# Patient Record
Sex: MALE | Race: WHITE
[De-identification: names, ages, dates, MRNs, and addresses within clinical notes are randomized per-mention and may not be internally consistent; named-entity substitution may affect disease eponyms.]

---

## 2019-10-17 PROBLEM — Z00.00 ENCOUNTER FOR PREVENTIVE HEALTH EXAMINATION: Status: ACTIVE | Noted: 2019-10-17

## 2019-11-12 ENCOUNTER — APPOINTMENT (OUTPATIENT)
Dept: PLASTIC SURGERY | Facility: CLINIC | Age: 34
End: 2019-11-12
Payer: COMMERCIAL

## 2019-11-12 DIAGNOSIS — L72.9 FOLLICULAR CYST OF THE SKIN AND SUBCUTANEOUS TISSUE, UNSPECIFIED: ICD-10-CM

## 2019-11-12 DIAGNOSIS — Z72.0 TOBACCO USE: ICD-10-CM

## 2019-11-12 PROCEDURE — 99203 OFFICE O/P NEW LOW 30 MIN: CPT

## 2019-11-12 NOTE — ASSESSMENT
[FreeTextEntry1] : 34 y/o M with multiple scalp cysts and right chest lipoma\par \par as above\par right ant chest lipoma approx 5cm x 4cm\par also multiple scalp cysts (5--all <2cm\par \par daily marijuana\par no cigarettes\par nondiabetic\par work in construction\par , one 5 year old boy\par \par excision in [place] of scalp cysts and chest lipoma\par \par Regarding the procedure, we discussed scarring, poor wound healing, bleeding, infection, need for additional surgery, and dissatisfaction with the outcome.  Also discussed possibility of keloid and/or hypertrophic scar formation as well as recurrence.  All questions were answered and risks understood.\par \par We discussed the risk of smoking on wound healing and the increased complications that are associated with smoking.\par

## 2019-11-12 NOTE — HISTORY OF PRESENT ILLNESS
[FreeTextEntry1] : Pt is a 32 y/o M with no significant PMH who presents for evaluation of multiple scalp cysts x5 years as well as right chest cyst, getting progressively larger. C/o pain to two areas. No treatment thus far. Denies drainage.\par \par Occasional cigarette smoker\par Daily marijuana\par Occ: \par Lives with wife and children

## 2019-11-12 NOTE — PHYSICAL EXAM
[de-identified] : well-appearing, NAD [de-identified] : 5 subcutaneous cystic lesions, largest to central scalp, approx 2cm, all mobile and nontender [de-identified] : right breast with 5cm subcutaneous soft tissue mass, mobile and nontender

## 2019-11-13 ENCOUNTER — LABORATORY RESULT (OUTPATIENT)
Age: 34
End: 2019-11-13

## 2019-11-13 ENCOUNTER — OUTPATIENT (OUTPATIENT)
Dept: OUTPATIENT SERVICES | Facility: HOSPITAL | Age: 34
LOS: 1 days | Discharge: HOME | End: 2019-11-13

## 2019-11-13 DIAGNOSIS — I10 ESSENTIAL (PRIMARY) HYPERTENSION: ICD-10-CM

## 2019-11-13 DIAGNOSIS — E78.5 HYPERLIPIDEMIA, UNSPECIFIED: ICD-10-CM

## 2019-11-13 DIAGNOSIS — Z00.00 ENCOUNTER FOR GENERAL ADULT MEDICAL EXAMINATION WITHOUT ABNORMAL FINDINGS: ICD-10-CM

## 2019-11-25 ENCOUNTER — OUTPATIENT (OUTPATIENT)
Dept: OUTPATIENT SERVICES | Facility: HOSPITAL | Age: 34
LOS: 1 days | Discharge: HOME | End: 2019-11-25
Payer: COMMERCIAL

## 2019-11-25 ENCOUNTER — APPOINTMENT (OUTPATIENT)
Dept: PLASTIC SURGERY | Facility: AMBULATORY SURGERY CENTER | Age: 34
End: 2019-11-25
Payer: COMMERCIAL

## 2019-11-25 ENCOUNTER — RESULT REVIEW (OUTPATIENT)
Age: 34
End: 2019-11-25

## 2019-11-25 VITALS
SYSTOLIC BLOOD PRESSURE: 143 MMHG | DIASTOLIC BLOOD PRESSURE: 88 MMHG | RESPIRATION RATE: 17 BRPM | HEART RATE: 68 BPM | OXYGEN SATURATION: 97 %

## 2019-11-25 VITALS
OXYGEN SATURATION: 98 % | HEART RATE: 62 BPM | DIASTOLIC BLOOD PRESSURE: 76 MMHG | SYSTOLIC BLOOD PRESSURE: 129 MMHG | HEIGHT: 69 IN | WEIGHT: 199.96 LBS | TEMPERATURE: 98 F | RESPIRATION RATE: 18 BRPM

## 2019-11-25 PROCEDURE — 88304 TISSUE EXAM BY PATHOLOGIST: CPT | Mod: 26

## 2019-11-25 PROCEDURE — 21552 EXC NECK LES SC 3 CM/>: CPT | Mod: 59

## 2019-11-25 PROCEDURE — 14021 TIS TRNFR S/A/L 10.1-30 SQCM: CPT

## 2019-11-25 RX ORDER — ONDANSETRON 8 MG/1
4 TABLET, FILM COATED ORAL ONCE
Refills: 0 | Status: DISCONTINUED | OUTPATIENT
Start: 2019-11-25 | End: 2019-12-17

## 2019-11-25 RX ORDER — CEPHALEXIN 500 MG
1 CAPSULE ORAL
Qty: 12 | Refills: 0
Start: 2019-11-25 | End: 2019-11-27

## 2019-11-25 RX ORDER — SODIUM CHLORIDE 9 MG/ML
1000 INJECTION, SOLUTION INTRAVENOUS
Refills: 0 | Status: DISCONTINUED | OUTPATIENT
Start: 2019-11-25 | End: 2019-12-17

## 2019-11-25 RX ORDER — HYDROMORPHONE HYDROCHLORIDE 2 MG/ML
0.5 INJECTION INTRAMUSCULAR; INTRAVENOUS; SUBCUTANEOUS
Refills: 0 | Status: DISCONTINUED | OUTPATIENT
Start: 2019-11-25 | End: 2019-11-25

## 2019-11-25 RX ORDER — OXYCODONE HYDROCHLORIDE 5 MG/1
5 TABLET ORAL ONCE
Refills: 0 | Status: DISCONTINUED | OUTPATIENT
Start: 2019-11-25 | End: 2019-11-25

## 2019-11-25 RX ORDER — TRAMADOL HYDROCHLORIDE 50 MG/1
1 TABLET ORAL
Qty: 10 | Refills: 0
Start: 2019-11-25 | End: 2019-11-27

## 2019-11-25 RX ADMIN — SODIUM CHLORIDE 100 MILLILITER(S): 9 INJECTION, SOLUTION INTRAVENOUS at 09:15

## 2019-11-25 NOTE — ASU DISCHARGE PLAN (ADULT/PEDIATRIC) - CARE PROVIDER_API CALL
Christiano Lucas)  Plastic Surgery; Surgery of the Hand  [address] [place] [address], [address]  [place] [address]  Phone: [phone]  Fax: [phone]  Follow Up Time:

## 2019-11-25 NOTE — ASU DISCHARGE PLAN (ADULT/PEDIATRIC) - PAIN MANAGEMENT
Keflex, Tramadol for pain as needed/Prescriptions electronically submitted to pharmacy from [facility]

## 2019-11-25 NOTE — BRIEF OPERATIVE NOTE - NSICDXBRIEFPOSTOP_GEN_ALL_CORE_FT
POST-OP DIAGNOSIS:  Lipoma of chest wall 25-Nov-2019 08:59:11  Deepali Blackwood  Scalp cyst 25-Nov-2019 08:59:03  Deepali Blackwood

## 2019-11-25 NOTE — ASU DISCHARGE PLAN (ADULT/PEDIATRIC) - ASU DC SPECIAL INSTRUCTIONSFT
Keep site clean and dry for at least 3 days.   Do not remove any dressings or get them wet.   Sleep with head elevated!  Place old towel over pillow as the incisions may drain slightly. Do not be alarmed.  Rest!!!

## 2019-11-25 NOTE — BRIEF OPERATIVE NOTE - NSICDXBRIEFPREOP_GEN_ALL_CORE_FT
PRE-OP DIAGNOSIS:  Scalp cyst 25-Nov-2019 08:58:42 multiple scalp cysts Deepali Blackwood  Lipoma of chest wall 25-Nov-2019 08:57:35 right chest Deepali Blackwood

## 2019-11-25 NOTE — BRIEF OPERATIVE NOTE - NSICDXBRIEFPROCEDURE_GEN_ALL_CORE_FT
PROCEDURES:  Excision, lipoma, breast 25-Nov-2019 09:01:24 right chest, excision of multiple scalp cysts Deepali Blackwood

## 2019-11-27 LAB — SURGICAL PATHOLOGY STUDY: SIGNIFICANT CHANGE UP

## 2019-11-29 DIAGNOSIS — D17.1 BENIGN LIPOMATOUS NEOPLASM OF SKIN AND SUBCUTANEOUS TISSUE OF TRUNK: ICD-10-CM

## 2019-11-29 DIAGNOSIS — L72.11 PILAR CYST: ICD-10-CM

## 2019-12-03 ENCOUNTER — APPOINTMENT (OUTPATIENT)
Dept: PLASTIC SURGERY | Facility: CLINIC | Age: 34
End: 2019-12-03
Payer: COMMERCIAL

## 2019-12-03 PROCEDURE — 99024 POSTOP FOLLOW-UP VISIT: CPT

## 2019-12-03 NOTE — PHYSICAL EXAM
[de-identified] : well-appearing, NAD [de-identified] : scattered scalp incisions all healing appropriately- clean, dry and intact with minimal swelling, no evidence of cellulitis or fluid collection, chromic sutures in place  [de-identified] : right chest incision clean, dry and intact, no cellulitis or fluid collection

## 2019-12-03 NOTE — ASSESSMENT
[FreeTextEntry1] : 32 y/o M with multiple scalp cysts and right chest lipoma now POD#8 s/p excision. Doing well. \par \par - dressings changed\par - daily Aquaphor\par - may use Scarguard on chest incision in 1 week\par - activity restrictions reinforced \par - may shower \par - pathology discussed\par - f/u 2-3 weeks\par \par \par \par

## 2019-12-03 NOTE — DATA REVIEWED
[FreeTextEntry1] :  Pathology             Final\par \par No Documents Attached\par \par \par \par   [facility] Accession Number : [id number]\par \par XENIA NOONAN                        1\par \par \par \par Surgical Final Report\par \par \par \par \par Final Diagnosis\par 1. Right chest lipoma:\par - Lipoma.\par \par 2. Scalp cysts:\par - Pilar cysts.\par \par Verified by: Liz New M.D.\par (Electronic Signature)\par Reported on: 11/27/19 15:28 EST, [facility],\[place] [address]\par Phone: [phone]   Fax: [phone]\par _________________________________________________________________\par \par Clinical History\par Excision\par Several yr history of scalp cysts and right chest lipoma\par \par Specimen(s) Submitted\par 1     Right chest lipoma\par 2     Scalp cysts\par \par Gross Description\par 1. The specimen is received in formalin, labeled "right chest\par lipoma" and consists of multiple fragments of lobulated tan\par yellow fibroadipose tissue measuring 7 x 7 x 3 cm in aggregate.\par Serial sections reveal homogenous light yellow tissue \par by thin grey fibrous bands. Representative sections are\par submitted. (3 block)\par \par 2.The specimen is received in formalin, labeled " multiple scalp\par cysts" and consists of five tan white cystic structures,\par measuring 1.5 x 1 x 0.5 cm, 1 x 1 x 0.5 cm and 1.5 x 0.5 x 0.5 cm\par and two smaller cysts, measuring 0.5 cm in maximum dimension. The\par cysts have smooth cream white external appearance. The external\par surface is inked black. Cut surface through all cysts are similar\par and reveals unilocular cyst cavity filled with tan yellow\par homogenous material. Representative sections are submitted. (1\par block)\par \par Specimen was received and underwent gross examination at [facility], [address] [place] [address],\par [place] [address].\par \par 11/25/19 15:20 ia\par \par Postoperative Diagnosis\par Multiple scalp cysts/right chest lipoma\par \par  \par \par  Ordered by: JESSICA REICH IV       Collected/Examined: 25Nov2019 08:50AM       \par Verification Required       Stage: Final       \par  Performed at: [facility]       Resulted: 27Nov2019 03:28PM       Last Updated: 27Nov2019 03:28PM       Accession: [id number]

## 2019-12-03 NOTE — HISTORY OF PRESENT ILLNESS
[FreeTextEntry1] : Pt is a 32 y/o M with no significant PMH who presents for evaluation of multiple scalp cysts x5 years as well as right chest cyst, getting progressively larger. C/o pain to two areas. No treatment thus far. Denies drainage.\par \par Occasional cigarette smoker\par Daily marijuana\par Occ: \par Lives with wife and children\par \par Interval hx (12/3/19). Patient presents today POD#8 s/p excision of multiple scalp cysts and right chest lipoma. Doing well c/o improving incisional discomfort and swelling. Completed prescribed course of oral antibiotics. Denies any fever, chills or bleeding.

## 2019-12-19 ENCOUNTER — APPOINTMENT (OUTPATIENT)
Dept: PLASTIC SURGERY | Facility: CLINIC | Age: 34
End: 2019-12-19
Payer: COMMERCIAL

## 2019-12-19 DIAGNOSIS — L72.11 PILAR CYST: ICD-10-CM

## 2019-12-19 DIAGNOSIS — D17.1 BENIGN LIPOMATOUS NEOPLASM OF SKIN AND SUBCUTANEOUS TISSUE OF TRUNK: ICD-10-CM

## 2019-12-19 PROCEDURE — 99024 POSTOP FOLLOW-UP VISIT: CPT

## 2019-12-19 NOTE — ASSESSMENT
[FreeTextEntry1] : 32 y/o M with multiple scalp cysts and right chest lipoma now 3 weeks s/p excision. Doing well. \par \par - remaining sutures removed\par - daily Aquaphor\par - may use Scarguard on chest incision in 1 week\par - activity restrictions reinforced \par - f/u PRN\par \par \par \par

## 2019-12-19 NOTE — HISTORY OF PRESENT ILLNESS
[FreeTextEntry1] : Pt is a 32 y/o M with no significant PMH who presents for evaluation of multiple scalp cysts x5 years as well as right chest cyst, getting progressively larger. C/o pain to two areas. No treatment thus far. Denies drainage.\par \par Occasional cigarette smoker\par Daily marijuana\par Occ: \par Lives with wife and children\par \par Interval hx (12/3/19). Patient presents today POD#8 s/p excision of multiple scalp cysts and right chest lipoma. Doing well c/o improving incisional discomfort and swelling. Completed prescribed course of oral antibiotics. Denies any fever, chills or bleeding. \par \par Interval hx (12/19/19). Patient presents today 3 weeks post-op c/o irritation to scalp from sutures. Doing well and reporting no significant pain, fever, chills or drainage.

## 2019-12-19 NOTE — DATA REVIEWED
[FreeTextEntry1] :  Pathology             Final\par \par No Documents Attached\par \par \par \par   [facility] Accession Number : [id number]\par \par XENIA NOONAN                        1\par \par \par \par Surgical Final Report\par \par \par \par \par Final Diagnosis\par 1. Right chest lipoma:\par - Lipoma.\par \par 2. Scalp cysts:\par - Pilar cysts.\par \par Verified by: Liz New M.D.\par (Electronic Signature)\par Reported on: 11/27/19 15:28 EST, [address] [facility],\[place] [address]\par Phone: [phone]   Fax: [phone]\par _________________________________________________________________\par \par Clinical History\par Excision\par Several yr history of scalp cysts and right chest lipoma\par \par Specimen(s) Submitted\par 1     Right chest lipoma\par 2     Scalp cysts\par \par Gross Description\par 1. The specimen is received in formalin, labeled "right chest\par lipoma" and consists of multiple fragments of lobulated tan\par yellow fibroadipose tissue measuring 7 x 7 x 3 cm in aggregate.\par Serial sections reveal homogenous light yellow tissue \par by thin grey fibrous bands. Representative sections are\par submitted. (3 block)\par \par 2.The specimen is received in formalin, labeled " multiple scalp\par cysts" and consists of five tan white cystic structures,\par measuring 1.5 x 1 x 0.5 cm, 1 x 1 x 0.5 cm and 1.5 x 0.5 x 0.5 cm\par and two smaller cysts, measuring 0.5 cm in maximum dimension. The\par cysts have smooth cream white external appearance. The external\par surface is inked black. Cut surface through all cysts are similar\par and reveals unilocular cyst cavity filled with tan yellow\par homogenous material. Representative sections are submitted. (1\par block)\par \par Specimen was received and underwent gross examination at [facility], [address] [place] [address],\par [place] [address].\par \par 11/25/19 15:20 ia\par \par Postoperative Diagnosis\par Multiple scalp cysts/right chest lipoma\par \par  \par \par  Ordered by: JESSICA REICH IV       Collected/Examined: 25Nov2019 08:50AM       \par Verification Required       Stage: Final       \par  Performed at: [facility]       Resulted: 27Nov2019 03:28PM       Last Updated: 27Nov2019 03:28PM       Accession: [id number]

## 2019-12-19 NOTE — PHYSICAL EXAM
[de-identified] : well-appearing, NAD [de-identified] : right chest incision clean, dry and intact, no cellulitis or fluid collection  [de-identified] : scattered scalp incisions all healing appropriately- clean, dry and intact with minimal swelling, no evidence of cellulitis or fluid collection, chromic sutures in place

## 2023-08-28 ENCOUNTER — NON-APPOINTMENT (OUTPATIENT)
Age: 38
End: 2023-08-28

## 2023-08-28 ENCOUNTER — APPOINTMENT (OUTPATIENT)
Dept: ORTHOPEDIC SURGERY | Facility: CLINIC | Age: 38
End: 2023-08-28
Payer: COMMERCIAL

## 2023-08-28 DIAGNOSIS — S90.30XA CONTUSION OF UNSPECIFIED FOOT, INITIAL ENCOUNTER: ICD-10-CM

## 2023-08-28 PROCEDURE — 73610 X-RAY EXAM OF ANKLE: CPT | Mod: RT

## 2023-08-28 PROCEDURE — 99203 OFFICE O/P NEW LOW 30 MIN: CPT

## 2023-08-28 RX ORDER — NABUMETONE 500 MG/1
500 TABLET, FILM COATED ORAL
Qty: 60 | Refills: 0 | Status: ACTIVE | COMMUNITY
Start: 2023-08-28 | End: 1900-01-01

## 2023-09-19 PROBLEM — S90.30XA CONTUSION OF HEEL, INITIAL ENCOUNTER: Status: ACTIVE | Noted: 2023-08-28

## 2024-06-25 ENCOUNTER — OUTPATIENT (OUTPATIENT)
Dept: OUTPATIENT SERVICES | Facility: HOSPITAL | Age: 39
LOS: 1 days | End: 2024-06-25
Payer: COMMERCIAL

## 2024-06-25 VITALS
OXYGEN SATURATION: 98 % | RESPIRATION RATE: 16 BRPM | DIASTOLIC BLOOD PRESSURE: 73 MMHG | HEART RATE: 63 BPM | SYSTOLIC BLOOD PRESSURE: 123 MMHG | HEIGHT: 70 IN | TEMPERATURE: 98 F | WEIGHT: 229.94 LBS

## 2024-06-25 DIAGNOSIS — Z01.818 ENCOUNTER FOR OTHER PREPROCEDURAL EXAMINATION: ICD-10-CM

## 2024-06-25 DIAGNOSIS — R22.2 LOCALIZED SWELLING, MASS AND LUMP, TRUNK: ICD-10-CM

## 2024-06-25 PROCEDURE — 99214 OFFICE O/P EST MOD 30 MIN: CPT | Mod: 25

## 2024-06-26 DIAGNOSIS — Z01.818 ENCOUNTER FOR OTHER PREPROCEDURAL EXAMINATION: ICD-10-CM

## 2024-06-26 DIAGNOSIS — R22.2 LOCALIZED SWELLING, MASS AND LUMP, TRUNK: ICD-10-CM

## 2024-07-01 ENCOUNTER — TRANSCRIPTION ENCOUNTER (OUTPATIENT)
Age: 39
End: 2024-07-01

## 2024-07-01 ENCOUNTER — OUTPATIENT (OUTPATIENT)
Dept: OUTPATIENT SERVICES | Facility: HOSPITAL | Age: 39
LOS: 1 days | Discharge: HOME | End: 2024-07-01
Payer: COMMERCIAL

## 2024-07-01 VITALS
HEIGHT: 70 IN | HEART RATE: 93 BPM | SYSTOLIC BLOOD PRESSURE: 137 MMHG | WEIGHT: 229.94 LBS | RESPIRATION RATE: 17 BRPM | DIASTOLIC BLOOD PRESSURE: 84 MMHG | OXYGEN SATURATION: 98 % | TEMPERATURE: 98 F

## 2024-07-01 VITALS — HEART RATE: 51 BPM | SYSTOLIC BLOOD PRESSURE: 140 MMHG | DIASTOLIC BLOOD PRESSURE: 97 MMHG | RESPIRATION RATE: 17 BRPM

## 2024-07-01 DIAGNOSIS — E66.9 OBESITY, UNSPECIFIED: ICD-10-CM

## 2024-07-01 DIAGNOSIS — R22.1 LOCALIZED SWELLING, MASS AND LUMP, NECK: ICD-10-CM

## 2024-07-01 DIAGNOSIS — L72.11 PILAR CYST: ICD-10-CM

## 2024-07-01 DIAGNOSIS — M79.89 OTHER SPECIFIED SOFT TISSUE DISORDERS: ICD-10-CM

## 2024-07-01 DIAGNOSIS — R22.2 LOCALIZED SWELLING, MASS AND LUMP, TRUNK: ICD-10-CM

## 2024-07-01 PROCEDURE — 88304 TISSUE EXAM BY PATHOLOGIST: CPT | Mod: 26

## 2024-07-01 PROCEDURE — 88304 TISSUE EXAM BY PATHOLOGIST: CPT

## 2024-07-01 RX ORDER — HYDROMORPHONE HCL 0.2 MG/ML
1 INJECTION, SOLUTION INTRAVENOUS
Refills: 0 | Status: DISCONTINUED | OUTPATIENT
Start: 2024-07-01 | End: 2024-07-01

## 2024-07-01 RX ORDER — OXYCODONE AND ACETAMINOPHEN 5; 325 MG/1; MG/1
1 TABLET ORAL
Qty: 8 | Refills: 0
Start: 2024-07-01 | End: 2024-07-02

## 2024-07-01 RX ORDER — HYDROMORPHONE HCL 0.2 MG/ML
0.5 INJECTION, SOLUTION INTRAVENOUS
Refills: 0 | Status: DISCONTINUED | OUTPATIENT
Start: 2024-07-01 | End: 2024-07-01

## 2024-07-01 RX ORDER — ONDANSETRON HYDROCHLORIDE 2 MG/ML
4 INJECTION INTRAMUSCULAR; INTRAVENOUS ONCE
Refills: 0 | Status: DISCONTINUED | OUTPATIENT
Start: 2024-07-01 | End: 2024-07-01

## 2024-07-01 RX ORDER — MEPERIDINE HYDROCHLORIDE 50 MG/1
12.5 TABLET ORAL ONCE
Refills: 0 | Status: DISCONTINUED | OUTPATIENT
Start: 2024-07-01 | End: 2024-07-01

## 2024-07-01 RX ORDER — DEXTROSE MONOHYDRATE AND SODIUM CHLORIDE 5; .3 G/100ML; G/100ML
1000 INJECTION, SOLUTION INTRAVENOUS
Refills: 0 | Status: DISCONTINUED | OUTPATIENT
Start: 2024-07-01 | End: 2024-07-01

## 2024-07-01 RX ADMIN — HYDROMORPHONE HCL 0.5 MILLIGRAM(S): 0.2 INJECTION, SOLUTION INTRAVENOUS at 10:49

## 2024-07-01 RX ADMIN — HYDROMORPHONE HCL 0.5 MILLIGRAM(S): 0.2 INJECTION, SOLUTION INTRAVENOUS at 10:20

## 2024-07-03 LAB — SURGICAL PATHOLOGY STUDY: SIGNIFICANT CHANGE UP
